# Patient Record
Sex: FEMALE | Race: OTHER | HISPANIC OR LATINO
[De-identification: names, ages, dates, MRNs, and addresses within clinical notes are randomized per-mention and may not be internally consistent; named-entity substitution may affect disease eponyms.]

---

## 2018-12-19 PROBLEM — Z00.00 ENCOUNTER FOR PREVENTIVE HEALTH EXAMINATION: Status: ACTIVE | Noted: 2018-12-19

## 2019-01-07 ENCOUNTER — APPOINTMENT (OUTPATIENT)
Dept: ORTHOPEDIC SURGERY | Facility: CLINIC | Age: 54
End: 2019-01-07
Payer: MEDICAID

## 2019-01-07 VITALS
HEIGHT: 63 IN | BODY MASS INDEX: 19.67 KG/M2 | HEART RATE: 75 BPM | DIASTOLIC BLOOD PRESSURE: 88 MMHG | SYSTOLIC BLOOD PRESSURE: 122 MMHG | WEIGHT: 111 LBS

## 2019-01-07 DIAGNOSIS — Z78.9 OTHER SPECIFIED HEALTH STATUS: ICD-10-CM

## 2019-01-07 PROCEDURE — 73030 X-RAY EXAM OF SHOULDER: CPT | Mod: RT

## 2019-01-07 PROCEDURE — 99204 OFFICE O/P NEW MOD 45 MIN: CPT

## 2019-01-14 ENCOUNTER — OUTPATIENT (OUTPATIENT)
Dept: OUTPATIENT SERVICES | Facility: HOSPITAL | Age: 54
LOS: 1 days | End: 2019-01-14
Payer: COMMERCIAL

## 2019-01-14 DIAGNOSIS — M75.41 IMPINGEMENT SYNDROME OF RIGHT SHOULDER: ICD-10-CM

## 2019-01-14 DIAGNOSIS — M54.2 CERVICALGIA: ICD-10-CM

## 2019-01-14 DIAGNOSIS — Z51.89 ENCOUNTER FOR OTHER SPECIFIED AFTERCARE: ICD-10-CM

## 2019-03-02 ENCOUNTER — EMERGENCY (EMERGENCY)
Facility: HOSPITAL | Age: 54
LOS: 1 days | Discharge: HOME | End: 2019-03-02
Attending: EMERGENCY MEDICINE
Payer: COMMERCIAL

## 2019-03-02 VITALS
HEART RATE: 70 BPM | OXYGEN SATURATION: 98 % | TEMPERATURE: 99 F | SYSTOLIC BLOOD PRESSURE: 153 MMHG | WEIGHT: 110.89 LBS | RESPIRATION RATE: 18 BRPM | HEIGHT: 63 IN | DIASTOLIC BLOOD PRESSURE: 81 MMHG

## 2019-03-02 PROCEDURE — 99282 EMERGENCY DEPT VISIT SF MDM: CPT

## 2019-03-02 NOTE — ED STATDOCS - OBJECTIVE STATEMENT
52 y/o F presents to the ED c/o left breast pain, onset 1 month ago. Reports worsening of symptoms as of today and constant pain; symptoms are exacerbated by movements. Reports mammogram last month in [facility] for pain in left breast and found nodule. Receives mammograms once per year. No medications taken. Surgeries: Tummy tuck and breast implant 7 years ago. Fhx of cancer of pt's aunt. Denies fever and chills. No further complaints at this time.

## 2019-03-02 NOTE — ED STATDOCS - CARDIAC, MLM
normal rate, regular rhythm, and no murmur. breast symmetrical appearing. Left no red, swelling, no signs of infection, no enlarged lymph nodes pal to left axilla. Breast implants palpable.

## 2019-03-02 NOTE — ED STATDOCS - CLINICAL SUMMARY MEDICAL DECISION MAKING FREE TEXT BOX
Patient with newly found nodules pt will need outpatient follow up with surgeon for further evaluation. Nothing emergent needed to be done at this time. Will give pt name of surgeon to follow up with. Recommend to attempt to follow up with another PCP.

## 2019-03-02 NOTE — ED ADULT TRIAGE NOTE - CHIEF COMPLAINT QUOTE
c/o left breast pain, states had a sonogram  last mth in [place] was told to follow up with  her doctor.

## 2019-03-04 ENCOUNTER — APPOINTMENT (OUTPATIENT)
Dept: ORTHOPEDIC SURGERY | Facility: CLINIC | Age: 54
End: 2019-03-04
Payer: MEDICAID

## 2019-03-04 VITALS
WEIGHT: 111 LBS | SYSTOLIC BLOOD PRESSURE: 113 MMHG | HEART RATE: 89 BPM | DIASTOLIC BLOOD PRESSURE: 75 MMHG | HEIGHT: 63 IN | BODY MASS INDEX: 19.67 KG/M2

## 2019-03-04 DIAGNOSIS — M75.41 IMPINGEMENT SYNDROME OF RIGHT SHOULDER: ICD-10-CM

## 2019-03-04 DIAGNOSIS — G56.21 LESION OF ULNAR NERVE, RIGHT UPPER LIMB: ICD-10-CM

## 2019-03-04 PROCEDURE — 99212 OFFICE O/P EST SF 10 MIN: CPT

## 2019-03-04 NOTE — HISTORY OF PRESENT ILLNESS
[de-identified] : Ms. DINH is a 53 year old female who is being seen for follow-up of the right shoulder. Patient states her pain has improved about 50 percent better. Has been using meloxicam and in PT.  She also used the cubital tunnel brace for 2 weeks which helped but then stopped, because she was better. Since stopping, her numbness/tingling came back at night time so she will re-start use.  \par \par She has not yet made an appt for physiatry and her neck is still bothering her.

## 2019-03-04 NOTE — CONSULT LETTER
[Dear  ___] : Dear  [unfilled], [Consult Letter:] : I had the pleasure of evaluating your patient, [unfilled]. [( Thank you for referring [unfilled] for consultation for _____ )] : Thank you for referring [unfilled] for consultation for [unfilled] [Please see my note below.] : Please see my note below. [Consult Closing:] : Thank you very much for allowing me to participate in the care of this patient.  If you have any questions, please do not hesitate to contact me. [Sincerely,] : Sincerely, [FreeTextEntry2] : Dr. Nicolette Guerra [FreeTextEntry3] : Esme Hassan MD\par [facility] [place] [facility]\par [address]\par [place] [id number]\par Tel [phone]\par

## 2019-03-04 NOTE — ASSESSMENT
[FreeTextEntry1] : Patient is a 53-year-old female who presents for followup for her right shoulder and right cubital tunnel syndrome. Patient is roughly 50% better and, on exam today, she does not exhibit any positive findings for cubital tunnel syndrome and questionable impingement symptoms at this time. I suspect that a sufficient portion of her pain may be coming from her neck. Patient has not yet seen physiatry after the referral was provided in January. I recommend that she picked the referral up today and followup with physiatry. Additionally, the patient continues to take meloxicam daily and I advised her to start weaning from this. Patient should continue with her physical therapy and home exercises. Patient should resume wearing cubital tunnel brace at night. If patient's numbness and tingling in her hands worsens and or her right shoulder pain worsens she should followup with me.

## 2019-03-04 NOTE — REVIEW OF SYSTEMS
[Joint Pain] : joint pain [Chills] : no chills [Discharge] : no discharge [Cough] : no cough [FreeTextEntry9] : right shoulder

## 2019-03-04 NOTE — PHYSICAL EXAM
[de-identified] : Patient is well appearing, in non acute distress with nonlabored breathing and appropriate mood and affect. Extra-ocular movements intact and no nasal discharge.\par \par Right UE warm and well perfused; palpable radial pulse\par SILT C5-T1\par motor intact to finger flexion, wrist extension and flexion, elbow flexion and extension, supination and pronation, deltoid\par Negative carpal Tinel's, negative carpal compression, negative cubital canal, negative elbow flexion\par Interossei, finger flexion, and EPL were 5 out of 5 motor strength\par \par Left UE warm and well perfused; palpable radial pulse\par SILT C5-T1\par motor intact to finger flexion, wrist extension and flexion, elbow flexion and extension, supination and pronation, deltoid\par \par Right Shoulder \par \par Appearance\par negative atrophy of musculature\par negative winging\par \par Tenderness to Palpation about the shoulder is mildly positive diffusely about the shoulder; of note, patient endorses tenderness everywhere I palpate in her upper extramedullary and patient has history of fibromyalgia. Patient appear to have the most tenderness along her C-spine and paraspinal musculature.\par \par Range of Motion: Active / Passive\par Forward Elevation: full \par Abduction: full \par ER at 0 degrees of abduction: full \par ER at 90 degrees of abduction: full \par Internal Rotation full\par \par Motor Strength\par Supraspinatus: 5 /5\par Infraspinatus: 5 /5\par Subscapularis: 5 /5\par Teres Minor: 5/ 5\par \par Provocative Maneuvers\par negative Drop Arm\par negative Belly Press\par negative Hornblowers\par Equivocal Talbert\par Equivocal Neer\par Equivocal Speeds\par \par Left Shoulder \par \par Appearance\par negative atrophy of musculature\par negative winging\par \par Tenderness to Palpation about the shoulder is negative\par \par Range of Motion: Active / Passive\par Forward Elevation: full \par Abduction: full \par ER at 0 degrees of abduction: full \par ER at 90 degrees of abduction: full \par Internal Rotation full\par \par Motor Strength\par Supraspinatus: 5 /5\par Infraspinatus: 5 /5\par Subscapularis: 5 /5\par Teres Minor: 5/ 5\par \par

## 2019-03-11 PROCEDURE — 97010 HOT OR COLD PACKS THERAPY: CPT

## 2019-03-11 PROCEDURE — 97140 MANUAL THERAPY 1/> REGIONS: CPT

## 2019-03-11 PROCEDURE — 97110 THERAPEUTIC EXERCISES: CPT

## 2019-03-11 PROCEDURE — 97162 PT EVAL MOD COMPLEX 30 MIN: CPT

## 2019-03-15 ENCOUNTER — APPOINTMENT (OUTPATIENT)
Dept: PHYSICAL MEDICINE AND REHAB | Facility: CLINIC | Age: 54
End: 2019-03-15

## 2019-03-25 ENCOUNTER — APPOINTMENT (OUTPATIENT)
Dept: PHYSICAL MEDICINE AND REHAB | Facility: CLINIC | Age: 54
End: 2019-03-25

## 2019-05-07 ENCOUNTER — APPOINTMENT (OUTPATIENT)
Dept: SURGERY | Facility: CLINIC | Age: 54
End: 2019-05-07

## 2019-05-13 ENCOUNTER — APPOINTMENT (OUTPATIENT)
Dept: PHYSICAL MEDICINE AND REHAB | Facility: CLINIC | Age: 54
End: 2019-05-13
Payer: MEDICAID

## 2019-05-13 VITALS
SYSTOLIC BLOOD PRESSURE: 120 MMHG | WEIGHT: 111 LBS | BODY MASS INDEX: 19.67 KG/M2 | HEIGHT: 63 IN | DIASTOLIC BLOOD PRESSURE: 82 MMHG

## 2019-05-13 DIAGNOSIS — M25.531 PAIN IN RIGHT WRIST: ICD-10-CM

## 2019-05-13 DIAGNOSIS — M54.2 CERVICALGIA: ICD-10-CM

## 2019-05-13 PROCEDURE — 99203 OFFICE O/P NEW LOW 30 MIN: CPT

## 2019-05-14 PROBLEM — M54.2 CERVICALGIA: Status: ACTIVE | Noted: 2019-01-07

## 2019-05-14 NOTE — HISTORY OF PRESENT ILLNESS
[FreeTextEntry1] : This is AMADEO DINH,  a pleasant 54 year-old female here for initial evaluation. \par Hx of anxiety and depression. \par Pain at right neck into shoulder elbow and wrist - present for several years.  She works as a hairdresser, makes the pain worse.  \par \par She was going to PT for the right shoulder - she stopped in March, she was going for 2-3 months.  \par It was helpful.\par \par No numbness and tingling.  Pain worse with sleeping, or using the right upper limb.  \par \par She was taking Mobic BID - now not taking anything.  \par \par Pain Description:\par Pain Scale: 3-9/10\par Relieving factors include rest.  \par Bowel/Bladder Incontinence: Denies\par Lower and Upper extremity weakness: Denies\par

## 2019-05-14 NOTE — ASSESSMENT
[FreeTextEntry1] : This is AMADEO DINH,  a 54 year-old female here for IE:\par \par Neck/right upper limb pain.\par - Appears myofascial at cervical paraspinals / upper trap, with radiation into the upper limb.  Doubt cervical radiculopathy.\par - Does not appear to have any intrinsic rt shoulder pathology. (xray done in ortho office - per their note: \par "well preserved glenohumeral joint space and acromioclavicular joint space\par cystic changes seen at greater tuberosity\par hooked acromion \par no fracture or dislocation"\par \par \par Plan:\par 1. Inflammatory labs as noted.\par 2. Xray cervical, elbow, and wrist - r/o erosions etc. \par 3. I will call her to review the results. \par 4. Cont. Mobic PRN. \par Medications have been discussed and reconciled, adverse reactions and side effects have been reviewed.\par \par \par

## 2019-06-30 ENCOUNTER — FORM ENCOUNTER (OUTPATIENT)
Age: 54
End: 2019-06-30

## 2019-07-01 ENCOUNTER — OUTPATIENT (OUTPATIENT)
Dept: OUTPATIENT SERVICES | Facility: HOSPITAL | Age: 54
LOS: 1 days | End: 2019-07-01
Payer: COMMERCIAL

## 2019-07-01 ENCOUNTER — APPOINTMENT (OUTPATIENT)
Dept: RADIOLOGY | Facility: CLINIC | Age: 54
End: 2019-07-01
Payer: MEDICAID

## 2019-07-01 ENCOUNTER — LABORATORY RESULT (OUTPATIENT)
Age: 54
End: 2019-07-01

## 2019-07-01 DIAGNOSIS — M25.531 PAIN IN RIGHT WRIST: ICD-10-CM

## 2019-07-01 LAB
CRP SERPL-MCNC: <0.1 MG/DL
TSH SERPL-ACNC: 0.72 UIU/ML

## 2019-07-01 PROCEDURE — 73080 X-RAY EXAM OF ELBOW: CPT | Mod: 26,RT

## 2019-07-01 PROCEDURE — 73080 X-RAY EXAM OF ELBOW: CPT

## 2019-07-01 PROCEDURE — 72040 X-RAY EXAM NECK SPINE 2-3 VW: CPT

## 2019-07-01 PROCEDURE — 73110 X-RAY EXAM OF WRIST: CPT | Mod: 26,RT

## 2019-07-01 PROCEDURE — 72040 X-RAY EXAM NECK SPINE 2-3 VW: CPT | Mod: 26

## 2019-07-01 PROCEDURE — 73110 X-RAY EXAM OF WRIST: CPT

## 2019-07-02 LAB
ENA SS-A AB SER IA-ACNC: <0.2 AL
ENA SS-B AB SER IA-ACNC: <0.2 AL
ERYTHROCYTE [SEDIMENTATION RATE] IN BLOOD BY WESTERGREN METHOD: 10 MM/HR
MPO AB + PR3 PNL SER: NORMAL

## 2019-07-03 LAB
ANA PAT FLD IF-IMP: ABNORMAL
ANA SER IF-ACNC: ABNORMAL
CCP AB SER IA-ACNC: <8 UNITS
RF+CCP IGG SER-IMP: NEGATIVE

## 2019-12-16 ENCOUNTER — APPOINTMENT (OUTPATIENT)
Dept: GASTROENTEROLOGY | Facility: CLINIC | Age: 54
End: 2019-12-16
Payer: MEDICAID

## 2019-12-16 VITALS
OXYGEN SATURATION: 99 % | SYSTOLIC BLOOD PRESSURE: 112 MMHG | RESPIRATION RATE: 16 BRPM | DIASTOLIC BLOOD PRESSURE: 78 MMHG | HEART RATE: 88 BPM

## 2019-12-16 PROCEDURE — 99204 OFFICE O/P NEW MOD 45 MIN: CPT

## 2019-12-16 NOTE — PHYSICAL EXAM
[General Appearance - Alert] : alert [General Appearance - In No Acute Distress] : in no acute distress [Sclera] : the sclera and conjunctiva were normal [Extraocular Movements] : extraocular movements were intact [PERRL With Normal Accommodation] : pupils were equal in size, round, and reactive to light [Outer Ear] : the ears and nose were normal in appearance [Neck Appearance] : the appearance of the neck was normal [Oropharynx] : the oropharynx was normal [Thyroid Diffuse Enlargement] : the thyroid was not enlarged [Jugular Venous Distention Increased] : there was no jugular-venous distention [Neck Cervical Mass (___cm)] : no neck mass was observed [Thyroid Nodule] : there were no palpable thyroid nodules [Auscultation Breath Sounds / Voice Sounds] : lungs were clear to auscultation bilaterally [Heart Rate And Rhythm] : heart rate was normal and rhythm regular [Heart Sounds Gallop] : no gallops [Heart Sounds] : normal S1 and S2 [Murmurs] : no murmurs [Heart Sounds Pericardial Friction Rub] : no pericardial rub [Edema] : there was no peripheral edema [Abdomen Soft] : soft [Bowel Sounds] : normal bowel sounds [] : no hepato-splenomegaly [Abdomen Tenderness] : non-tender [Abdomen Mass (___ Cm)] : no abdominal mass palpated [Supraclavicular Lymph Nodes Enlarged Bilaterally] : supraclavicular [Cervical Lymph Nodes Enlarged Anterior Bilaterally] : anterior cervical [Cervical Lymph Nodes Enlarged Posterior Bilaterally] : posterior cervical [Skin Color & Pigmentation] : normal skin color and pigmentation [Nail Clubbing] : no clubbing  or cyanosis of the fingernails [Skin Turgor] : normal skin turgor [Oriented To Time, Place, And Person] : oriented to person, place, and time [No Focal Deficits] : no focal deficits [Affect] : the affect was normal [Impaired Insight] : insight and judgment were intact

## 2019-12-16 NOTE — CONSULT LETTER
[Dear  ___] : Dear  [unfilled], [( Thank you for referring [unfilled] for consultation for _____ )] : Thank you for referring [unfilled] for consultation for [unfilled] [Please see my note below.] : Please see my note below. [Consult Closing:] : Thank you very much for allowing me to participate in the care of this patient.  If you have any questions, please do not hesitate to contact me. [FreeTextEntry3] : Very truly yours,\par \par LUIS Plummer MD\par [facility]\par Gastroenterology at [place]\par [address], [address]\[place] [address]\par Tel [phone]\par Fax [phone]

## 2019-12-16 NOTE — ASSESSMENT
[FreeTextEntry1] : The patient is of average risk for colorectal cancer.  Will schedule routine screening colonoscopy.  TriLyte prep ordered.  The description of the colonoscopy procedure was discussed in depth as were the alternatives and risks, the alternatives including a barium enema, a CT scan, a CT colonography, or stool testing (hemoccult/FIT).  It was explained that although these alternatives may be useful and may give general information about problems within the colon, but they do not provide the in-depth information, direct visibility and the ability to resect polyps as a colonoscopy does.  The risks were thoroughly described and may include but are not limited to: bleeding (immediate or up to 14 days after the procedure), missed polyps and/or cancer that may not be seen during the procedure, rectal irritation, medication reaction (to sedation, or any other medications administered), irritation of the vein used for IV medication, infection, tear or perforation of the colon and rectum, abdominal bloating and cramping.  Additionally discussed were rare complications that may require additional treatment such as surgery, hospitalization, repeat endoscopy and/or blood transfusion.  Lastly, mentioned is a small risk of cardiopulmonary events such as loss of breathing and heart rhythm disturbances including rare cardiopulmonary arrest.

## 2019-12-16 NOTE — HISTORY OF PRESENT ILLNESS
[de-identified] : The patient was referred by PCP for evaluation for a screening colonoscopy.  There is no colonoscopy history.  The patient denies any changes in bowel habits, abdominal pain, rectal bleeding, nausea, vomiting, diarrhea, constipation or a family history of colorectal cancer.  The patient reports feeling well and has no complaints.

## 2020-03-14 ENCOUNTER — EMERGENCY (EMERGENCY)
Facility: HOSPITAL | Age: 55
LOS: 1 days | Discharge: HOME | End: 2020-03-14
Attending: EMERGENCY MEDICINE
Payer: COMMERCIAL

## 2020-03-14 VITALS
TEMPERATURE: 99 F | DIASTOLIC BLOOD PRESSURE: 90 MMHG | SYSTOLIC BLOOD PRESSURE: 137 MMHG | HEART RATE: 93 BPM | WEIGHT: 110.01 LBS | HEIGHT: 63 IN | RESPIRATION RATE: 18 BRPM | OXYGEN SATURATION: 99 %

## 2020-03-14 LAB — RAPID RVP RESULT: SIGNIFICANT CHANGE UP

## 2020-03-14 PROCEDURE — 99283 EMERGENCY DEPT VISIT LOW MDM: CPT

## 2020-03-14 PROCEDURE — 87486 CHLMYD PNEUM DNA AMP PROBE: CPT

## 2020-03-14 PROCEDURE — 87581 M.PNEUMON DNA AMP PROBE: CPT

## 2020-03-14 PROCEDURE — 99284 EMERGENCY DEPT VISIT MOD MDM: CPT

## 2020-03-14 PROCEDURE — 87798 DETECT AGENT NOS DNA AMP: CPT

## 2020-03-14 PROCEDURE — 87633 RESP VIRUS 12-25 TARGETS: CPT

## 2020-03-14 NOTE — ED ADULT TRIAGE NOTE - CHIEF COMPLAINT QUOTE
patient recently returned from [place] 2 days ago states  that she has fever ( non at this time)  HA sore throat stomach ache for 3 days, unknown if in contact with CV

## 2020-03-14 NOTE — ED STATDOCS - OBJECTIVE STATEMENT
55 y/o female no pmh c/o sore throat, subjective fever, headache and occasional cough/nasal congestion. States was just in [facility] and [place] for 5 total days, no travel to anywhere else, no known close contact to any covid positive person. No photophobia, meningismus, neuro symptoms, cp, sob, abd pain, neuro symptoms. Pt here requesting covid testing. No other complaints.     ROS:  No eye pain/changes in vision, No ear pain/dysphagia, No chest pain/palpitations. No SOB No abdominal pain, N/V/D, no black/bloody bm. No dysuria/frequency/discharge,  No Dizziness.    No rashes or breaks in skin. No numbness/tingling/weakness.

## 2020-03-14 NOTE — ED STATDOCS - CLINICAL SUMMARY MEDICAL DECISION MAKING FREE TEXT BOX
1-2 days of viral type symptoms, no sob, very well appearing, no known covid contacts, no  travel other than [place]. per current [facility] protocol, no covid testing, rvp and instructions for self quarantine with return precautions and supportive care. redcap survey. stable for d/c.

## 2020-03-14 NOTE — ED STATDOCS - PHYSICAL EXAMINATION
Gen: No acute distress, non toxic  HEENT: Mucous membranes moist, pink conjunctivae, EOMI. nl pharynx.   Neck: supple  CV: RRR, nl s1/s2.  Resp: CTAB, normal rate and effort  GI: Abdomen soft, NT, ND. No rebound, no guarding  : No CVAT  Neuro: A&O x 3, moving all 4 extremities  MSK: No spine or joint tenderness to palpation  Skin: No rashes. intact and perfused.

## 2020-03-30 ENCOUNTER — APPOINTMENT (OUTPATIENT)
Dept: GASTROENTEROLOGY | Facility: GI CENTER | Age: 55
End: 2020-03-30

## 2021-07-07 ENCOUNTER — APPOINTMENT (OUTPATIENT)
Dept: VASCULAR SURGERY | Facility: CLINIC | Age: 56
End: 2021-07-07

## 2023-03-28 ENCOUNTER — NON-APPOINTMENT (OUTPATIENT)
Age: 58
End: 2023-03-28

## 2023-03-28 ENCOUNTER — APPOINTMENT (OUTPATIENT)
Dept: GASTROENTEROLOGY | Facility: CLINIC | Age: 58
End: 2023-03-28
Payer: MEDICAID

## 2023-03-28 VITALS
HEART RATE: 88 BPM | BODY MASS INDEX: 19.84 KG/M2 | WEIGHT: 112 LBS | DIASTOLIC BLOOD PRESSURE: 74 MMHG | HEIGHT: 63 IN | RESPIRATION RATE: 14 BRPM | SYSTOLIC BLOOD PRESSURE: 110 MMHG | OXYGEN SATURATION: 98 %

## 2023-03-28 PROCEDURE — 99204 OFFICE O/P NEW MOD 45 MIN: CPT

## 2023-03-28 RX ORDER — POLYETHYLENE GLYOCOL 3350, SODIUM CHLORIDE, SODIUM BICARBONATE AND POTASSIUM CHLORIDE 420; 11.2; 5.72; 1.48 G/4L; G/4L; G/4L; G/4L
420 POWDER, FOR SOLUTION NASOGASTRIC; ORAL
Qty: 1 | Refills: 0 | Status: DISCONTINUED | COMMUNITY
Start: 2019-12-16 | End: 2023-03-28

## 2023-03-28 RX ORDER — MELOXICAM 7.5 MG/1
7.5 TABLET ORAL
Qty: 30 | Refills: 1 | Status: ACTIVE | COMMUNITY
Start: 2019-01-07

## 2023-03-28 RX ORDER — IBUPROFEN 200 MG/1
TABLET, COATED ORAL
Refills: 0 | Status: ACTIVE | COMMUNITY

## 2023-03-28 NOTE — CONSULT LETTER
[Dear  ___] : Dear  [unfilled], [Consult Letter:] : I had the pleasure of evaluating your patient, [unfilled]. [Please see my note below.] : Please see my note below. [Consult Closing:] : Thank you very much for allowing me to participate in the care of this patient.  If you have any questions, please do not hesitate to contact me. [Sincerely,] : Sincerely, [FreeTextEntry1] : Average risk for development of colorectal neoplasia.  Screening colonoscopy arranged. [FreeTextEntry3] : Emory Alexander MD FACG\par Diplomate American Board of Internal Medicine and Gastroenterolgy\par [facility]\par

## 2023-03-28 NOTE — PHYSICAL EXAM
[Alert] : alert [Normal Voice/Communication] : normal voice/communication [Healthy Appearing] : healthy appearing [No Acute Distress] : no acute distress [Sclera] : the sclera and conjunctiva were normal [Hearing Threshold Finger Rub Not [place]] : hearing was normal [Normal Lips/Gums] : the lips and gums were normal [Oropharynx] : the oropharynx was normal [Normal Appearance] : the appearance of the neck was normal [No Neck Mass] : no neck mass was observed [No Respiratory Distress] : no respiratory distress [No Acc Muscle Use] : no accessory muscle use [Respiration, Rhythm And Depth] : normal respiratory rhythm and effort [Auscultation Breath Sounds / Voice Sounds] : lungs were clear to auscultation bilaterally [Heart Rate And Rhythm] : heart rate was normal and rhythm regular [Normal S1, S2] : normal S1 and S2 [Murmurs] : no murmurs [Bowel Sounds] : normal bowel sounds [Abdomen Tenderness] : non-tender [No Masses] : no abdominal mass palpated [Abdomen Soft] : soft [] : no hepatosplenomegaly [Oriented To Time, Place, And Person] : oriented to person, place, and time [de-identified] : Flat soft bowel sounds present transverse suprapubic scar.  No organomegaly.  No mass tenderness or rebound.  No distention. [de-identified] : Deferred to procedure. [de-identified] : Normal. [de-identified] : Normal. [de-identified] : Normal.

## 2023-03-28 NOTE — ASSESSMENT
[FreeTextEntry1] : Family history is negative for colorectal neoplasia.  Patient is asymptomatic from GI point of view.  We will review blood work from PCP when available.  Patient appears to be at average risk for development of colorectal neoplasia.  Therefore screening colonoscopy was offered to the patient.  The procedure, its risk benefits and prep, were explained to the patient, who understands and is agreeable to proceed.  ASA #1.  Gavel light prep to be utilized.  Split dose regimen.  Patient appears to be in optimal medical condition to undergo planned procedure.  Arrangements made.  Results to follow.

## 2023-03-28 NOTE — HISTORY OF PRESENT ILLNESS
[FreeTextEntry1] : 58-year-old  female with history of tummy tuck and breast augmentation in past.  No significant past medical history.  Patient was previously evaluated by M: In 12/16/2019 decided for screening colonoscopy.  Family history had been and remains negative for colorectal neoplasia.  COVID intervened and procedure scheduled for 3/20 was canceled never performed.\par Patient returns to reestablish care and proceed to screening colonoscopy.  In the interim there has been no COVID.\par Bowel movements remain normal and daily.  No rectal bleeding.  No abdominal pain.  No change in pattern of bowel movements.  No anemia.  Order light anemic in the past.  No prior evaluation.  Blood work has recently been done by PCP.  Not currently available.  Family history is negative for colorectal neoplasia.\par \par Only current medications occasional NSAIDs for shoulder impingement.\par No cardiopulmonary issues.

## 2023-04-26 ENCOUNTER — APPOINTMENT (OUTPATIENT)
Dept: GASTROENTEROLOGY | Facility: GI CENTER | Age: 58
End: 2023-04-26

## 2023-05-23 ENCOUNTER — TRANSCRIPTION ENCOUNTER (OUTPATIENT)
Age: 58
End: 2023-05-23

## 2023-05-23 RX ORDER — POLYETHYLENE GLYCOL-3350 AND ELECTROLYTES WITH FLAVOR PACK 240; 5.84; 2.98; 6.72; 22.72 G/278.26G; G/278.26G; G/278.26G; G/278.26G; G/278.26G
240 POWDER, FOR SOLUTION ORAL
Qty: 1 | Refills: 0 | Status: ACTIVE | COMMUNITY
Start: 2023-03-28 | End: 1900-01-01

## 2023-05-24 ENCOUNTER — OUTPATIENT (OUTPATIENT)
Dept: OUTPATIENT SERVICES | Facility: HOSPITAL | Age: 58
LOS: 1 days | End: 2023-05-24
Payer: COMMERCIAL

## 2023-05-24 ENCOUNTER — APPOINTMENT (OUTPATIENT)
Dept: GASTROENTEROLOGY | Facility: GI CENTER | Age: 58
End: 2023-05-24
Payer: MEDICAID

## 2023-05-24 DIAGNOSIS — Z12.11 ENCOUNTER FOR SCREENING FOR MALIGNANT NEOPLASM OF COLON: ICD-10-CM

## 2023-05-24 PROCEDURE — 45378 DIAGNOSTIC COLONOSCOPY: CPT | Mod: 33

## 2023-05-24 PROCEDURE — G0121: CPT

## 2023-05-24 NOTE — PHYSICAL EXAM
[Alert] : alert [Normal Voice/Communication] : normal voice/communication [Healthy Appearing] : healthy appearing [No Acute Distress] : no acute distress [Hearing Threshold Finger Rub Not [place]] : hearing was normal [Sclera] : the sclera and conjunctiva were normal [Normal Lips/Gums] : the lips and gums were normal [Oropharynx] : the oropharynx was normal [Normal Appearance] : the appearance of the neck was normal [No Respiratory Distress] : no respiratory distress [No Neck Mass] : no neck mass was observed [No Acc Muscle Use] : no accessory muscle use [Respiration, Rhythm And Depth] : normal respiratory rhythm and effort [Auscultation Breath Sounds / Voice Sounds] : lungs were clear to auscultation bilaterally [Heart Rate And Rhythm] : heart rate was normal and rhythm regular [Normal S1, S2] : normal S1 and S2 [Murmurs] : no murmurs [Bowel Sounds] : normal bowel sounds [Abdomen Tenderness] : non-tender [No Masses] : no abdominal mass palpated [Abdomen Soft] : soft [] : no hepatosplenomegaly [Oriented To Time, Place, And Person] : oriented to person, place, and time

## 2023-11-29 ENCOUNTER — APPOINTMENT (OUTPATIENT)
Dept: RHEUMATOLOGY | Facility: CLINIC | Age: 58
End: 2023-11-29
Payer: MEDICAID

## 2023-11-29 VITALS
HEIGHT: 63 IN | TEMPERATURE: 97.3 F | WEIGHT: 110 LBS | SYSTOLIC BLOOD PRESSURE: 113 MMHG | BODY MASS INDEX: 19.49 KG/M2 | DIASTOLIC BLOOD PRESSURE: 74 MMHG

## 2023-11-29 DIAGNOSIS — M75.81 OTHER SHOULDER LESIONS, RIGHT SHOULDER: ICD-10-CM

## 2023-11-29 DIAGNOSIS — M25.50 PAIN IN UNSPECIFIED JOINT: ICD-10-CM

## 2023-11-29 PROCEDURE — 99204 OFFICE O/P NEW MOD 45 MIN: CPT | Mod: 25

## 2023-11-29 PROCEDURE — 20610 DRAIN/INJ JOINT/BURSA W/O US: CPT

## 2023-11-29 PROCEDURE — 20550 NJX 1 TENDON SHEATH/LIGAMENT: CPT | Mod: 59

## 2023-11-29 RX ORDER — NAPROXEN 500 MG/1
500 TABLET ORAL
Qty: 60 | Refills: 2 | Status: ACTIVE | COMMUNITY
Start: 2023-11-29 | End: 1900-01-01

## 2023-11-29 RX ORDER — TRIAMCINOLONE ACETONIDE 40 MG/ML
40 SUSPENSION INTRA-ARTERIAL; INTRAMUSCULAR
Qty: 1 | Refills: 0 | Status: COMPLETED | OUTPATIENT
Start: 2023-11-29

## 2023-11-29 RX ORDER — METHYLPRED ACET/NACL,ISO-OS/PF 40 MG/ML
40 VIAL (ML) INJECTION
Qty: 1 | Refills: 0 | Status: COMPLETED | OUTPATIENT
Start: 2023-11-29

## 2023-11-29 RX ADMIN — Medication 0 %: at 00:00

## 2023-11-29 RX ADMIN — TRIAMCINOLONE ACETONIDE 0 MG/ML: 40 INJECTION, SUSPENSION INTRA-ARTICULAR; INTRAMUSCULAR at 00:00

## 2023-11-29 RX ADMIN — Medication 0 MG/ML: at 00:00

## 2024-01-13 ENCOUNTER — NON-APPOINTMENT (OUTPATIENT)
Age: 59
End: 2024-01-13

## 2024-10-30 ENCOUNTER — APPOINTMENT (OUTPATIENT)
Dept: RHEUMATOLOGY | Facility: CLINIC | Age: 59
End: 2024-10-30
Payer: MEDICAID

## 2024-10-30 VITALS
BODY MASS INDEX: 19.84 KG/M2 | HEIGHT: 63 IN | OXYGEN SATURATION: 99 % | DIASTOLIC BLOOD PRESSURE: 76 MMHG | HEART RATE: 71 BPM | TEMPERATURE: 97.2 F | SYSTOLIC BLOOD PRESSURE: 115 MMHG | WEIGHT: 112 LBS

## 2024-10-30 DIAGNOSIS — M70.61 TROCHANTERIC BURSITIS, RIGHT HIP: ICD-10-CM

## 2024-10-30 DIAGNOSIS — M25.50 PAIN IN UNSPECIFIED JOINT: ICD-10-CM

## 2024-10-30 DIAGNOSIS — M75.81 OTHER SHOULDER LESIONS, RIGHT SHOULDER: ICD-10-CM

## 2024-10-30 PROCEDURE — 99214 OFFICE O/P EST MOD 30 MIN: CPT

## 2024-10-30 RX ORDER — MELOXICAM 15 MG/1
15 TABLET ORAL
Qty: 60 | Refills: 1 | Status: ACTIVE | COMMUNITY
Start: 2024-10-30 | End: 1900-01-01

## 2024-12-16 ENCOUNTER — OUTPATIENT (OUTPATIENT)
Dept: OUTPATIENT SERVICES | Facility: HOSPITAL | Age: 59
LOS: 1 days | End: 2024-12-16

## 2024-12-16 DIAGNOSIS — M25.50 PAIN IN UNSPECIFIED JOINT: ICD-10-CM

## 2024-12-23 ENCOUNTER — APPOINTMENT (OUTPATIENT)
Dept: ULTRASOUND IMAGING | Facility: CLINIC | Age: 59
End: 2024-12-23

## 2025-08-05 ENCOUNTER — APPOINTMENT (OUTPATIENT)
Dept: NEUROLOGY | Facility: CLINIC | Age: 60
End: 2025-08-05
Payer: MEDICAID

## 2025-08-05 ENCOUNTER — NON-APPOINTMENT (OUTPATIENT)
Age: 60
End: 2025-08-05

## 2025-08-05 VITALS
HEIGHT: 63 IN | BODY MASS INDEX: 19.84 KG/M2 | HEART RATE: 70 BPM | SYSTOLIC BLOOD PRESSURE: 104 MMHG | OXYGEN SATURATION: 98 % | WEIGHT: 112 LBS | DIASTOLIC BLOOD PRESSURE: 60 MMHG

## 2025-08-05 DIAGNOSIS — G43.709 CHRONIC MIGRAINE W/OUT AURA, NOT INTRACTABLE, W/OUT STATUS MIGRAINOSUS: ICD-10-CM

## 2025-08-05 PROCEDURE — 99203 OFFICE O/P NEW LOW 30 MIN: CPT

## 2025-08-05 RX ORDER — RIZATRIPTAN BENZOATE 5 MG/1
5 TABLET ORAL
Qty: 16 | Refills: 2 | Status: ACTIVE | COMMUNITY
Start: 2025-08-05 | End: 1900-01-01

## 2025-08-05 RX ORDER — NORTRIPTYLINE HYDROCHLORIDE 10 MG/1
10 CAPSULE ORAL
Qty: 30 | Refills: 2 | Status: ACTIVE | COMMUNITY
Start: 2025-08-05 | End: 1900-01-01

## 2025-09-09 ENCOUNTER — NON-APPOINTMENT (OUTPATIENT)
Age: 60
End: 2025-09-09

## 2025-09-09 ENCOUNTER — APPOINTMENT (OUTPATIENT)
Dept: NEUROLOGY | Facility: CLINIC | Age: 60
End: 2025-09-09

## 2025-09-09 VITALS
HEIGHT: 63 IN | HEART RATE: 89 BPM | BODY MASS INDEX: 19.84 KG/M2 | SYSTOLIC BLOOD PRESSURE: 122 MMHG | DIASTOLIC BLOOD PRESSURE: 74 MMHG | OXYGEN SATURATION: 97 % | WEIGHT: 112 LBS

## 2025-09-09 DIAGNOSIS — M26.609 UNSPECIFIED TEMPOROMANDIBULAR JOINT DISORDER: ICD-10-CM

## 2025-09-09 PROCEDURE — 99215 OFFICE O/P EST HI 40 MIN: CPT

## 2025-09-09 RX ORDER — GALCANEZUMAB 120 MG/ML
120 INJECTION, SOLUTION SUBCUTANEOUS
Qty: 2 | Refills: 0 | Status: ACTIVE | COMMUNITY
Start: 2025-09-09 | End: 1900-01-01

## 2025-09-09 RX ORDER — GALCANEZUMAB 120 MG/ML
120 INJECTION, SOLUTION SUBCUTANEOUS
Qty: 1 | Refills: 4 | Status: ACTIVE | COMMUNITY
Start: 2025-09-09 | End: 1900-01-01

## 2025-09-09 RX ORDER — UBROGEPANT 100 MG/1
100 TABLET ORAL
Qty: 16 | Refills: 1 | Status: ACTIVE | COMMUNITY
Start: 2025-09-09 | End: 1900-01-01

## (undated) DEVICE — STERIS DEFENDO 3-PIECE KIT (AIR/WATER, SUCTION & BIOPSY VALVES)